# Patient Record
Sex: MALE | Race: WHITE
[De-identification: names, ages, dates, MRNs, and addresses within clinical notes are randomized per-mention and may not be internally consistent; named-entity substitution may affect disease eponyms.]

---

## 2018-04-06 ENCOUNTER — HOSPITAL ENCOUNTER (EMERGENCY)
Dept: HOSPITAL 14 - H.ER | Age: 23
Discharge: HOME | End: 2018-04-06
Payer: MEDICAID

## 2018-04-06 VITALS
TEMPERATURE: 98.1 F | DIASTOLIC BLOOD PRESSURE: 71 MMHG | RESPIRATION RATE: 16 BRPM | HEART RATE: 71 BPM | SYSTOLIC BLOOD PRESSURE: 113 MMHG | OXYGEN SATURATION: 99 %

## 2018-04-06 VITALS — BODY MASS INDEX: 28.5 KG/M2

## 2018-04-06 DIAGNOSIS — F41.9: Primary | ICD-10-CM

## 2018-04-06 DIAGNOSIS — F31.9: ICD-10-CM
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
## 2018-05-19 ENCOUNTER — HOSPITAL ENCOUNTER (EMERGENCY)
Dept: HOSPITAL 14 - H.ER | Age: 23
Discharge: HOME | End: 2018-05-19
Payer: MEDICAID

## 2018-05-19 VITALS — OXYGEN SATURATION: 99 %

## 2018-05-19 VITALS
SYSTOLIC BLOOD PRESSURE: 130 MMHG | DIASTOLIC BLOOD PRESSURE: 78 MMHG | RESPIRATION RATE: 18 BRPM | TEMPERATURE: 98 F | HEART RATE: 81 BPM

## 2018-05-19 VITALS — BODY MASS INDEX: 28.5 KG/M2

## 2018-05-19 DIAGNOSIS — F19.10: Primary | ICD-10-CM

## 2018-05-19 DIAGNOSIS — F31.9: ICD-10-CM

## 2018-05-19 DIAGNOSIS — B37.2: ICD-10-CM

## 2018-05-19 LAB
ALBUMIN SERPL-MCNC: 4.3 G/DL (ref 3.5–5)
ALBUMIN/GLOB SERPL: 1 {RATIO} (ref 1–2.1)
ALT SERPL-CCNC: 65 U/L (ref 21–72)
AST SERPL-CCNC: 34 U/L (ref 17–59)
BACTERIA #/AREA URNS HPF: (no result) /[HPF]
BASOPHILS # BLD AUTO: 0.1 K/UL (ref 0–0.2)
BASOPHILS NFR BLD: 0.6 % (ref 0–2)
BILIRUB UR-MCNC: NEGATIVE MG/DL
BUN SERPL-MCNC: 6 MG/DL (ref 9–20)
CALCIUM SERPL-MCNC: 8.9 MG/DL (ref 8.4–10.2)
COLOR UR: YELLOW
EOSINOPHIL # BLD AUTO: 0.2 K/UL (ref 0–0.7)
EOSINOPHIL NFR BLD: 2.8 % (ref 0–4)
ERYTHROCYTE [DISTWIDTH] IN BLOOD BY AUTOMATED COUNT: 13.7 % (ref 11.5–14.5)
GFR NON-AFRICAN AMERICAN: > 60
GLUCOSE UR STRIP-MCNC: (no result) MG/DL
HGB BLD-MCNC: 16.3 G/DL (ref 12–18)
LEUKOCYTE ESTERASE UR-ACNC: (no result) LEU/UL
LYMPHOCYTES # BLD AUTO: 2.3 K/UL (ref 1–4.3)
LYMPHOCYTES NFR BLD AUTO: 27.8 % (ref 20–40)
MCH RBC QN AUTO: 30.9 PG (ref 27–31)
MCHC RBC AUTO-ENTMCNC: 34 G/DL (ref 33–37)
MCV RBC AUTO: 91 FL (ref 80–94)
MONOCYTES # BLD: 0.8 K/UL (ref 0–0.8)
MONOCYTES NFR BLD: 9 % (ref 0–10)
NEUTROPHILS # BLD: 5 K/UL (ref 1.8–7)
NEUTROPHILS NFR BLD AUTO: 59.8 % (ref 50–75)
NRBC BLD AUTO-RTO: 0 % (ref 0–0)
PH UR STRIP: 6 [PH] (ref 5–8)
PLATELET # BLD: 240 K/UL (ref 130–400)
PMV BLD AUTO: 7.9 FL (ref 7.2–11.7)
PROT UR STRIP-MCNC: NEGATIVE MG/DL
RBC # BLD AUTO: 5.28 MIL/UL (ref 4.4–5.9)
RBC # UR STRIP: NEGATIVE /UL
SP GR UR STRIP: 1.01 (ref 1–1.03)
URINE CLARITY: (no result)
URINE HYALINE CAST: (no result) /HPF (ref 0–2)
UROBILINOGEN UR-MCNC: (no result) MG/DL (ref 0.2–1)
WBC # BLD AUTO: 8.4 K/UL (ref 4.8–10.8)

## 2018-05-19 PROCEDURE — 80053 COMPREHEN METABOLIC PANEL: CPT

## 2018-05-19 PROCEDURE — 80324 DRUG SCREEN AMPHETAMINES 1/2: CPT

## 2018-05-19 PROCEDURE — 99284 EMERGENCY DEPT VISIT MOD MDM: CPT

## 2018-05-19 PROCEDURE — 80349 CANNABINOIDS NATURAL: CPT

## 2018-05-19 PROCEDURE — 83690 ASSAY OF LIPASE: CPT

## 2018-05-19 PROCEDURE — 80353 DRUG SCREENING COCAINE: CPT

## 2018-05-19 PROCEDURE — 81003 URINALYSIS AUTO W/O SCOPE: CPT

## 2018-05-19 PROCEDURE — 80358 DRUG SCREENING METHADONE: CPT

## 2018-05-19 PROCEDURE — 80345 DRUG SCREENING BARBITURATES: CPT

## 2018-05-19 PROCEDURE — 85025 COMPLETE CBC W/AUTO DIFF WBC: CPT

## 2018-05-19 PROCEDURE — 83992 ASSAY FOR PHENCYCLIDINE: CPT

## 2018-05-19 PROCEDURE — 80346 BENZODIAZEPINES1-12: CPT

## 2018-05-19 PROCEDURE — 80361 OPIATES 1 OR MORE: CPT

## 2018-05-19 PROCEDURE — 80320 DRUG SCREEN QUANTALCOHOLS: CPT

## 2018-05-19 NOTE — ED PDOC
HPI: Psych/Substance Abuse


Time Seen by Provider: 05/19/18 15:26


Chief Complaint (Nursing): Substance Abuse


Chief Complaint (Provider): alcohol abuse and taking clonopin


History Per: Patient


History/Exam Limitations: no limitations


Onset/Duration Of Symptoms: Hrs


Additional Complaint(s): 





21 yo male with history of bipolar brought in by mother and EMS for evaluation. 

PT states that he took 5mg of clonopin and his normal dose is 2 mg BID. PT 

states he also drank 2 margaritas. Pt denies SI/HI. Pt states he was anxious 

over work and has not been taking his medications for bipolar. Pt states he 

wanted to relax. Pt states he has appointment with his psychiatrist in June. PT 

also reports rash on the left groin area. Pt states that it burns. Pt reports 

rash x 2 weeks and has not used anything for it. 





Past Medical History


Reviewed: Historical Data, Nursing Documentation, Vital Signs


Vital Signs: 





 Last Vital Signs











Temp  98 F   05/19/18 19:28


 


Pulse  81   05/19/18 19:28


 


Resp  18   05/19/18 19:28


 


BP  130/78   05/19/18 19:28


 


Pulse Ox  100   05/19/18 19:28














- Medical History


PMH: Bipolar Disorder, Depression, Paranoia


   Denies: Diabetes, Hepatitis, HIV, HTN, Seizures, Sexually Transmitted Disease





- Surgical History


Surgical History: No Surg Hx





- Family History


Family History: States: Unknown Family Hx





- Living Arrangements


Living Arrangements: With Family





- Social History


Current smoker - smoking cessation education provided: No


Alcohol: Occasional


Drugs: Prescription medications





- Immunization History


Hx Tetanus Toxoid Vaccination: No (unknown)





- Home Medications


Home Medications: 


 Ambulatory Orders











 Medication  Instructions  Recorded


 


Cephalexin [cephalexin] 500 mg PO QID #20 cap 06/03/15


 


Escitalopram [Lexapro] 5 mg PO DAILY 06/03/15


 


OLANZapine [Zyprexa] 10 mg PO DAILY 06/03/15


 


Clotrimazole 1% Cream [Lotrimin 1% 1 applic TOP BID #2 tube 05/19/18





CREAM]  














- Allergies


Allergies/Adverse Reactions: 


 Allergies











Allergy/AdvReac Type Severity Reaction Status Date / Time


 


No Known Allergies Allergy   Verified 04/13/16 14:12














Review of Systems


ROS Statement: Except As Marked, All Systems Reviewed And Found Negative


Constitutional: Negative for: Fever, Chills


Skin: Positive for: Rash





Physical Exam





- Reviewed


Nursing Documentation Reviewed: Yes


Vital Signs Reviewed: Yes





- Physical Exam


Appears: Positive for: Well, Non-toxic, No Acute Distress


Head Exam: Positive for: ATRAUMATIC, NORMAL INSPECTION, NORMOCEPHALIC


Skin: Positive for: Warm.  Negative for: Normal Color (Erythematous rash, left 

groin with satellite lesions )


Eye Exam: Positive for: Normal appearance, EOMI, PERRL


ENT: Positive for: Normal ENT Inspection


Neck: Positive for: Normal, Painless ROM


Cardiovascular/Chest: Positive for: Regular Rate, Rhythm


Respiratory: Positive for: Normal Breath Sounds.  Negative for: Accessory 

Muscle Use, Respiratory Distress


Gastrointestinal/Abdominal: Positive for: Normal Exam, Soft.  Negative for: 

Tenderness


Back: Positive for: Normal Inspection


Extremity: Positive for: Normal ROM


Neurologic/Psych: Positive for: Alert, Oriented





- Laboratory Results


Result Diagrams: 


 05/19/18 15:56





 05/19/18 15:56





- ECG


O2 Sat by Pulse Oximetry: 100


Pulse Ox Interpretation: Normal





Medical Decision Making


Medical Decision Making: 





PT placed on cardiac monitor. 





2000 - Alert and oreinted with clear speech ands steady gait. PT tolerated 

foot. 





Disposition





- Clinical Impression


Clinical Impression: 


 Polysubstance abuse, Yeast infection of the skin








- Patient ED Disposition


Is Patient to be Admitted: No


Counseled Patient/Family Regarding: Diagnosis, Need For Followup, Rx Given





- Disposition


Referrals: 


[facility] [Outside]


[facility] [Outside]


Disposition: Routine/Home


Disposition Time: 20:18


Condition: STABLE


Prescriptions: 


Clotrimazole 1% Cream [Lotrimin 1% CREAM] 1 applic TOP BID #2 tube


Instructions:  Drug Abuse and Drug Addiction (DC), Yeast Infection (DC)

## 2018-10-15 ENCOUNTER — HOSPITAL ENCOUNTER (EMERGENCY)
Dept: HOSPITAL 14 - H.ER | Age: 23
LOS: 1 days | Discharge: HOME | End: 2018-10-16
Payer: COMMERCIAL

## 2018-10-15 VITALS — BODY MASS INDEX: 28.5 KG/M2

## 2018-10-15 VITALS — OXYGEN SATURATION: 99 % | TEMPERATURE: 98.2 F | RESPIRATION RATE: 16 BRPM

## 2018-10-15 DIAGNOSIS — F41.9: Primary | ICD-10-CM

## 2018-10-15 DIAGNOSIS — F31.9: ICD-10-CM

## 2018-10-16 VITALS — SYSTOLIC BLOOD PRESSURE: 124 MMHG | HEART RATE: 80 BPM | DIASTOLIC BLOOD PRESSURE: 54 MMHG
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
## 2018-10-18 ENCOUNTER — HOSPITAL ENCOUNTER (EMERGENCY)
Dept: HOSPITAL 14 - H.ER | Age: 23
Discharge: HOME | End: 2018-10-18
Payer: COMMERCIAL

## 2018-10-18 VITALS
OXYGEN SATURATION: 99 % | HEART RATE: 67 BPM | SYSTOLIC BLOOD PRESSURE: 133 MMHG | RESPIRATION RATE: 18 BRPM | DIASTOLIC BLOOD PRESSURE: 74 MMHG | TEMPERATURE: 98 F

## 2018-10-18 VITALS — BODY MASS INDEX: 28.5 KG/M2

## 2018-10-18 DIAGNOSIS — F41.9: Primary | ICD-10-CM

## 2018-10-18 DIAGNOSIS — Z00.8: ICD-10-CM

## 2018-10-18 DIAGNOSIS — Z86.59: ICD-10-CM

## 2018-10-18 NOTE — ED PDOC
HPI: Psych/Substance Abuse


Time Seen by Provider: 10/18/18 10:20


Chief Complaint (Nursing): Anxiety


Chief Complaint (Provider): Anxiety


History Per: Patient


Additional Complaint(s): 





22 yo male, notes a PMH of anxiety, previously managed on 1.0 mg Klonopin PO 

qHS, presents to ED with complaints of increasing panic attacks. 


Pt requesting Xanax instead. pt tried to make appointment with psychologist, 

unable to get into office until November. Pt out of klonipin at this time. 





No SI or HI. Pt offers no physical complaints. 





Past Medical History


Reviewed: Nursing Documentation, Vital Signs


Vital Signs: 





                                Last Vital Signs











Temp  98.0 F   10/18/18 10:11


 


Pulse  67   10/18/18 10:11


 


Resp  18   10/18/18 10:11


 


BP  133/74   10/18/18 10:11


 


Pulse Ox  99   10/18/18 10:11














- Medical History


PMH: Anxiety, Bipolar Disorder, Depression, Paranoia


   Denies: Diabetes, Hepatitis, HIV, HTN, Seizures, Sexually Transmitted Disease





- Family History


Family History: States: Unknown Family Hx





- Living Arrangements


Living Arrangements: With Family





- Social History


Current smoker - smoking cessation education provided: No


Alcohol: Social


Drugs: Cannabis





- Immunization History


Hx Tetanus Toxoid Vaccination: No (unknown)





- Home Medications


Home Medications: 


                                Ambulatory Orders











 Medication  Instructions  Recorded


 


Cephalexin [cephalexin] 500 mg PO QID #20 cap 06/03/15


 


Escitalopram [Lexapro] 5 mg PO DAILY 06/03/15


 


OLANZapine [Zyprexa] 10 mg PO DAILY 06/03/15


 


RX: Clotrimazole 1% Cream 1 applic TOP BID #2 tube 05/19/18





[Lotrimin 1% CREAM]  














- Allergies


Allergies/Adverse Reactions: 


                                    Allergies











Allergy/AdvReac Type Severity Reaction Status Date / Time


 


trazodone Allergy  RASH Verified 10/18/18 10:39


 


zolpidem [From Ambien] Allergy  RASH Verified 10/18/18 10:39














Review of Systems


ROS Statement: Except As Marked, All Systems Reviewed And Found Negative


Psych: Positive for: Anxiety





Physical Exam





- Reviewed


Nursing Documentation Reviewed: Yes


Vital Signs Reviewed: Yes





- Physical Exam


Appears: Positive for: Well, Non-toxic, No Acute Distress


Head Exam: Positive for: ATRAUMATIC, NORMAL INSPECTION, NORMOCEPHALIC


Skin: Positive for: Normal Color, Warm, DRY


Eye Exam: Positive for: EOMI, Normal appearance, PERRL


ENT: Positive for: Normal ENT Inspection


Neck: Positive for: Normal, Painless ROM


Cardiovascular/Chest: Positive for: Regular Rate, Rhythm


Respiratory: Positive for: CNT, Normal Breath Sounds


Gastrointestinal/Abdominal: Positive for: Normal Exam, Soft


Back: Positive for: Normal Inspection


Extremity: Positive for: Normal ROM


Neurologic/Psych: Positive for: Alert, Oriented





- ECG


O2 Sat by Pulse Oximetry: 99





Medical Decision Making


Medical Decision Making: 





Pt offered Crisis eval while in ED and declined.








Pt medicated with Xanax PO. Advised no RX will be administered at this time. Pt 

agreeable to medication in house and being discharged














Disposition





- Clinical Impression


Clinical Impression: 


 Anxiety disorder








- Patient ED Disposition


Is Patient to be Admitted: No





- Disposition


Referrals: 


Non [facility] Provider, [Primary Care Provider] - 


Disposition: Routine/Home


Disposition Time: 10:45


Condition: STABLE


Instructions:  Anxiety, Adult (DC)


Forms:  [facility] (English)

## 2018-10-29 ENCOUNTER — HOSPITAL ENCOUNTER (EMERGENCY)
Dept: HOSPITAL 14 - H.ER | Age: 23
Discharge: HOME | End: 2018-10-29
Payer: COMMERCIAL

## 2018-10-29 VITALS
OXYGEN SATURATION: 99 % | SYSTOLIC BLOOD PRESSURE: 128 MMHG | TEMPERATURE: 98.3 F | RESPIRATION RATE: 18 BRPM | DIASTOLIC BLOOD PRESSURE: 77 MMHG

## 2018-10-29 VITALS — BODY MASS INDEX: 28.5 KG/M2

## 2018-10-29 VITALS — HEART RATE: 80 BPM

## 2018-10-29 DIAGNOSIS — Z86.59: ICD-10-CM

## 2018-10-29 DIAGNOSIS — F41.9: Primary | ICD-10-CM

## 2018-10-29 NOTE — ED PDOC
HPI: General Adult


Time Seen by Provider: 10/29/18 02:44


Chief Complaint (Nursing): Anxiety


Chief Complaint (Provider): Anxiety


History Per: Patient


History/Exam Limitations: no limitations


Onset/Duration Of Symptoms: Hrs (earlier today)


Additional Complaint(s): 





Patient is a 24 y/o male who states that earlier today he was trying to sleep 

but developed an anxiety attack. Patient reports that his chest got tight and 

the episode lasted 20 to 30 minutes before resolving spontaneously. He states he

was thinking about a record deal and throughout the day feels as if everyone is 

talking about him. He denies suicidal or homicidal ideation as well as any 

symptoms. He does not currently have any symptoms. He also denies chest pain or 

shortness of breath.





Past Medical History


Vital Signs: 





                                Last Vital Signs











Temp  98.3 F   10/29/18 02:35


 


Pulse  86   10/29/18 02:35


 


Resp  18   10/29/18 02:35


 


BP  128/77   10/29/18 02:35


 


Pulse Ox  99   10/29/18 02:35














- Medical History


PMH: Anxiety, Bipolar Disorder, Depression, Paranoia


   Denies: Diabetes, Hepatitis, HIV, HTN, Seizures, Sexually Transmitted Disease





- Family History


Family History: States: Unknown Family Hx





- Immunization History


Hx Tetanus Toxoid Vaccination: No (unknown)





- Home Medications


Home Medications: 


                                Ambulatory Orders











 Medication  Instructions  Recorded


 


Cephalexin [cephalexin] 500 mg PO QID #20 cap 06/03/15


 


Escitalopram [Lexapro] 5 mg PO DAILY 06/03/15


 


OLANZapine [Zyprexa] 10 mg PO DAILY 06/03/15


 


Clotrimazole 1% Cream [Lotrimin 1% 1 applic TOP BID #2 tube 05/19/18





CREAM]  














- Allergies


Allergies/Adverse Reactions: 


                                    Allergies











Allergy/AdvReac Type Severity Reaction Status Date / Time


 


trazodone Allergy  RASH Verified 10/18/18 10:39


 


zolpidem [From Ambien] Allergy  RASH Verified 10/18/18 10:39














Review of Systems


ROS Statement: Except As Marked, All Systems Reviewed And Found Negative


Cardiovascular: Negative for: Chest Pain


Respiratory: Negative for: Shortness of Breath


Psych: Positive for: Anxiety





Physical Exam





- Reviewed


Nursing Documentation Reviewed: Yes


Vital Signs Reviewed: Yes





- Physical Exam


Appears: Positive for: Non-toxic, No Acute Distress


Head Exam: Positive for: ATRAUMATIC, NORMOCEPHALIC


Skin: Positive for: Normal Color, Warm, DRY


Eye Exam: Positive for: EOMI, Normal appearance, PERRL


ENT: Positive for: Normal ENT Inspection


Neck: Positive for: Normal, Painless ROM


Cardiovascular/Chest: Positive for: Regular Rate, Rhythm.  Negative for: Murmur


Respiratory: Positive for: Normal Breath Sounds.  Negative for: Respiratory 

Distress


Gastrointestinal/Abdominal: Positive for: Normal Exam, Soft.  Negative for: 

Tenderness


Extremity: Positive for: Normal ROM.  Negative for: Pedal Edema, Deformity


Neurologic/Psych: Positive for: Alert, Oriented, Mood/Affect (calm and 

cooperative).  Negative for: Motor/Sensory Deficits





- ECG


ECG: Positive for: Interpreted By Me


ECG Rhythm: Positive for: Sinus Rhythm.  Negative for: ST/T Changes


Rate: 83


O2 Sat by Pulse Oximetry: 99 (RA)


Pulse Ox Interpretation: Normal





- Progress


ED Course And Treament: 





Pt. evaluated by Susan GAONA who spoke with Dr. Stone and cleared pt. for 

discharge.


Re-evaluation Time: 04:44 (Reports no chest pain or SOB while in ED. )


Condition: Re-examined, Unchanged





Medical Decision Making


Medical Decision Making: 





Time: 02:45


Impression: Anxiety


Initial plan:


EKG


Drug screen


Crisis Evaluation











 

--------------------------------------------------------------------------------


-----------------------


Scribe Attestation:


Documented by Morales Self, acting as a scribe for Rubin Holden PA-C.





Provider Scribe Attestation:


All medical record entries made by the Scribe were at my direction and 

personally dictated by me. I have reviewed the chart and agree that the record 

accurately reflects my personal performance of the history, physical exam, 

medical decision making, and the department course for this patient. I have also

personally directed, reviewed, and agree with the discharge.





Disposition





- Clinical Impression


Clinical Impression: 


 Anxiety








- Patient ED Disposition


Is Patient to be Admitted: No





- Disposition


Referrals: 


[facility] [Outside]


Disposition: Routine/Home


Disposition Time: 04:44


Condition: STABLE


Additional Instructions: 





MAURIZIO STEIN, thank you for letting us take care of you today. Your provider 

was Renny Cedeno MD and you were treated for PANIC ATTACK. The emergency 

medical care you received today was directed at your acute symptoms. If you were

 prescribed any medication, please fill it and take as directed. It may take 

several days for your symptoms to resolve. Return to the Emergency Department if

 your symptoms worsen, do not improve, or if you have any other problems.





Please contact your doctor or call one of the physicians/clinics you have been 

referred to that are listed on the Patient Visit Information form that is 

included in your discharge packet. Bring any paperwork you were given at 

discharge with you along with any medications you are taking to your follow up 

visit. Our treatment cannot replace ongoing medical care by a primary care 

provider outside of the emergency department.





Thank you for allowing the [employer] [facility] team to be part of your care today.








If you had an X-Ray or CT scan: A Radiologist will review the ED reading if any 

change in treatment is needed we will contact you.***





If you had a blood, urine, or wound culture: It will take several days for the 

results, if any change in treatment is needed we will contact you.***





If you had an STI test: It will take 48 hours for the results. Please call after

 1 week if you have not heard back.***


Instructions:  Anxiety, Adult (DC)


Forms:  [facility] (English)

## 2018-10-29 NOTE — CARD
--------------- APPROVED REPORT --------------





Date of service: 10/29/2018



EKG Measurement

Heart [id number]

[id number]

[id number]

[id number]

[id number]



<Conclusion>

Normal sinus rhythm with sinus arrhythmia

Normal ECG

## 2019-01-17 ENCOUNTER — HOSPITAL ENCOUNTER (EMERGENCY)
Dept: HOSPITAL 14 - H.ER | Age: 24
Discharge: HOME | End: 2019-01-17
Payer: MEDICAID

## 2019-01-17 VITALS — HEART RATE: 69 BPM | RESPIRATION RATE: 16 BRPM | DIASTOLIC BLOOD PRESSURE: 69 MMHG | SYSTOLIC BLOOD PRESSURE: 122 MMHG

## 2019-01-17 VITALS — BODY MASS INDEX: 41.3 KG/M2

## 2019-01-17 VITALS — TEMPERATURE: 98 F

## 2019-01-17 VITALS — OXYGEN SATURATION: 100 %

## 2019-01-17 DIAGNOSIS — F41.9: ICD-10-CM

## 2019-01-17 DIAGNOSIS — F31.9: ICD-10-CM

## 2019-01-17 DIAGNOSIS — R07.9: Primary | ICD-10-CM

## 2019-01-17 DIAGNOSIS — F19.11: ICD-10-CM

## 2019-01-17 LAB
ALBUMIN SERPL-MCNC: 4.7 G/DL (ref 3.5–5)
ALBUMIN/GLOB SERPL: 1.1 {RATIO} (ref 1–2.1)
ALT SERPL-CCNC: 54 U/L (ref 21–72)
AST SERPL-CCNC: 34 U/L (ref 17–59)
BASOPHILS # BLD AUTO: 0 K/UL (ref 0–0.2)
BASOPHILS NFR BLD: 0.3 % (ref 0–2)
BUN SERPL-MCNC: 11 MG/DL (ref 9–20)
CALCIUM SERPL-MCNC: 9.4 MG/DL (ref 8.4–10.2)
EOSINOPHIL # BLD AUTO: 0.1 K/UL (ref 0–0.7)
EOSINOPHIL NFR BLD: 0.6 % (ref 0–4)
ERYTHROCYTE [DISTWIDTH] IN BLOOD BY AUTOMATED COUNT: 13.8 % (ref 11.5–14.5)
GFR NON-AFRICAN AMERICAN: > 60
HGB BLD-MCNC: 16.6 G/DL (ref 12–18)
LYMPHOCYTES # BLD AUTO: 2.1 K/UL (ref 1–4.3)
LYMPHOCYTES NFR BLD AUTO: 16.6 % (ref 20–40)
MCH RBC QN AUTO: 31.1 PG (ref 27–31)
MCHC RBC AUTO-ENTMCNC: 33.2 G/DL (ref 33–37)
MCV RBC AUTO: 93.4 FL (ref 80–94)
MONOCYTES # BLD: 0.8 K/UL (ref 0–0.8)
MONOCYTES NFR BLD: 6.2 % (ref 0–10)
NEUTROPHILS # BLD: 9.5 K/UL (ref 1.8–7)
NEUTROPHILS NFR BLD AUTO: 76.3 % (ref 50–75)
NRBC BLD AUTO-RTO: 0 % (ref 0–0)
PLATELET # BLD: 273 K/UL (ref 130–400)
PMV BLD AUTO: 8.5 FL (ref 7.2–11.7)
RBC # BLD AUTO: 5.36 MIL/UL (ref 4.4–5.9)
WBC # BLD AUTO: 12.5 K/UL (ref 4.8–10.8)

## 2019-01-17 PROCEDURE — 82948 REAGENT STRIP/BLOOD GLUCOSE: CPT

## 2019-01-17 PROCEDURE — 83735 ASSAY OF MAGNESIUM: CPT

## 2019-01-17 PROCEDURE — 85025 COMPLETE CBC W/AUTO DIFF WBC: CPT

## 2019-01-17 PROCEDURE — 84484 ASSAY OF TROPONIN QUANT: CPT

## 2019-01-17 PROCEDURE — 99283 EMERGENCY DEPT VISIT LOW MDM: CPT

## 2019-01-17 PROCEDURE — 71101 X-RAY EXAM UNILAT RIBS/CHEST: CPT

## 2019-01-17 PROCEDURE — 80053 COMPREHEN METABOLIC PANEL: CPT

## 2019-01-17 PROCEDURE — 93005 ELECTROCARDIOGRAM TRACING: CPT

## 2019-01-17 NOTE — CARD
--------------- APPROVED REPORT --------------





Date of service: 01/17/2019



EKG Measurement

Heart [id number]

MA [id number]

[id number]

[id number]

[id number]



<Conclusion>

Normal sinus rhythm

Normal ECG

## 2019-01-17 NOTE — RAD
Date of service: 



01/17/2019



PROCEDURE:  Radiographs of the Chest and Right Ribs.



HISTORY:

r/o rib fx



COMPARISON:

Chest x-ray 01/13/2013 



TECHNIQUE:

Frontal radiograph of the chest and multiple oblique radiographs of 

the right ribs were obtained.



FINDINGS:



RIGHT RIBS:

No displaced rib fracture identified.



LUNGS:

No focal consolidation is seen.



PLEURA:

No pneumothorax or pleural effusion identified.



CARDIOVASCULAR:

Heart size within normal limits. 



No aortic atherosclerotic calcification present



OTHER FINDINGS:

None.



IMPRESSION:

No displaced right rib fracture identified. No focal consolidation is 

seen.

## 2019-01-17 NOTE — ED PDOC
HPI: General Adult


Time Seen by Provider: 01/17/19 13:55


Chief Complaint (Nursing): Medical Clearance


Chief Complaint (Provider): Substance abuse


History Per: Patient


History/Exam Limitations: no limitations


Additional Complaint(s): 





23 year old male, with a history of polysubstance abuse, presents to the ED with

chest pain.  Patient states he last used cocaine 2-3 weeks ago and had marijuana

and a dose of Abilify yesterday.  He reports he has been abusing Xanax and 

Klonopin over the last year.  Today he states he felt flushed and had right 

sided cramping chest pain intermittent in the shower while he was trying to make

himself feel better.  He denies syncope.  Patient is requesting detox. 





PMD: none





Past Medical History


Reviewed: Historical Data, Nursing Documentation, Vital Signs


Vital Signs: 





                                Last Vital Signs











Temp  98 F   01/17/19 13:35


 


Pulse  71   01/17/19 13:35


 


Resp  18   01/17/19 13:35


 


BP  129/64   01/17/19 13:35


 


Pulse Ox  100   01/17/19 13:35














- Medical History


PMH: Anxiety, Bipolar Disorder, Depression, Paranoia


   Denies: Diabetes, Hepatitis, HIV, HTN, Seizures, Sexually Transmitted Disease





- Surgical History


Surgical History: No Surg Hx





- Family History


Family History: States: Unknown Family Hx





- Social History


Drugs: Cannabis, Cocaine





- Immunization History


Hx Tetanus Toxoid Vaccination: No (unknown)





- Home Medications


Home Medications: 


                                Ambulatory Orders











 Medication  Instructions  Recorded


 


Cephalexin [cephalexin] 500 mg PO QID #20 cap 06/03/15


 


Escitalopram [Lexapro] 5 mg PO DAILY 06/03/15


 


OLANZapine [Zyprexa] 10 mg PO DAILY 06/03/15


 


RX: Clotrimazole 1% Cream 1 applic TOP BID #2 tube 05/19/18





[Lotrimin 1% CREAM]  














- Allergies


Allergies/Adverse Reactions: 


                                    Allergies











Allergy/AdvReac Type Severity Reaction Status Date / Time


 


trazodone Allergy  RASH Verified 01/17/19 13:39


 


zolpidem [From Ambien] Allergy  RASH Verified 01/17/19 13:39














Review of Systems


ROS Statement: Except As Marked, All Systems Reviewed And Found Negative


Cardiovascular: Positive for: Chest Pain (right sided )


Neurological: Negative for: Other (syncope)





Physical Exam





- Reviewed


Nursing Documentation Reviewed: Yes


Vital Signs Reviewed: Yes





- Physical Exam


Appears: Positive for: Non-toxic, No Acute Distress


Head Exam: Positive for: ATRAUMATIC, NORMOCEPHALIC


Skin: Positive for: Normal Color, Warm, Dry


Eye Exam: Positive for: Normal appearance


Neck: Positive for: Normal, Painless ROM


Cardiovascular/Chest: Positive for: Regular Rate, Rhythm, Other (reproducible 

chest pain on the right lower rib cage)


Respiratory: Positive for: Normal Breath Sounds.  Negative for: Wheezing, 

Respiratory Distress


Gastrointestinal/Abdominal: Positive for: Normal Exam, Soft.  Negative for: 

Tenderness


Extremity: Positive for: Normal ROM


Neurologic/Psych: Positive for: Other (minimal tremor noted, resolved during 

interview)





- Laboratory Results


Result Diagrams: 


                                 01/17/19 14:30





                                 01/17/19 14:30





- ECG


ECG Rhythm: Positive for: Sinus Bradycardia (NSR 60BPM; NO ECTOPY; NO ACUTE 

CHANGES)


O2 Sat by Pulse Oximetry: 100 (RA)


Pulse Ox Interpretation: Normal





- Radiology


X-Ray: Read By Radiologist (NAD)





- Progress


ED Course And Treament: 





Patient feels improved upon re-examination.





Given Detox program information for follow up.





Medical Decision Making


Medical Decision Making: 





Initial Plan: 


--ECG


--Alcohol serum 


--CMP


--Magnesium stat


--Troponin stat


--Urine drug screen


--CBC


--Right Rib X-ray





-----------------------------

--------------------------------------------------------------------


Scribe Attestation:


Documented by Daniel Cohen acting as a scribe for Srinivasa MORRISON.





Provider Scribe Attestation:


All medical record entries made by the Scribe were at my direction and 

personally dictated by me. I have reviewed the chart and agree that the record 

accurately reflects my personal performance of the history, physical exam, 

medical decision making, and the department course for this patient. I have also

personally directed, reviewed, and agree with the discharge instructions and 

disposition.





Disposition





- Clinical Impression


Clinical Impression: 


 Muscular chest pain








- Patient ED Disposition


Is Patient to be Admitted: No





- Disposition


Referrals: 


[facility] [Outside]


Disposition: Routine/Home


Disposition Time: 16:16


Condition: FAIR


Instructions:  Polysubstance Abuse (DC)


Forms:  [facility] ED School/Work Excuse